# Patient Record
Sex: MALE | Race: WHITE | URBAN - METROPOLITAN AREA
[De-identification: names, ages, dates, MRNs, and addresses within clinical notes are randomized per-mention and may not be internally consistent; named-entity substitution may affect disease eponyms.]

---

## 2019-05-20 ENCOUNTER — PREPPED CHART (OUTPATIENT)
Dept: URBAN - METROPOLITAN AREA CLINIC 79 | Facility: CLINIC | Age: 84
End: 2019-05-20

## 2019-11-02 ASSESSMENT — TONOMETRY
OS_IOP_MMHG: 18
OD_IOP_MMHG: 18
OD_IOP_MMHG: 20
OS_IOP_MMHG: 20
OS_IOP_MMHG: 17
OS_IOP_MMHG: 12
OD_IOP_MMHG: 22
OD_IOP_MMHG: 14

## 2019-11-02 ASSESSMENT — VISUAL ACUITY
OD_SC: 20/40
OS_SC: 20/30

## 2019-12-05 ENCOUNTER — FOLLOW UP (OUTPATIENT)
Dept: URBAN - METROPOLITAN AREA CLINIC 79 | Facility: CLINIC | Age: 84
End: 2019-12-05

## 2019-12-05 DIAGNOSIS — I10: ICD-10-CM

## 2019-12-05 DIAGNOSIS — H16.223: ICD-10-CM

## 2019-12-05 DIAGNOSIS — H40.023: ICD-10-CM

## 2019-12-05 DIAGNOSIS — H40.033: ICD-10-CM

## 2019-12-05 DIAGNOSIS — H25.13: ICD-10-CM

## 2019-12-05 PROCEDURE — 92133 CPTRZD OPH DX IMG PST SGM ON: CPT

## 2019-12-05 PROCEDURE — 92014 COMPRE OPH EXAM EST PT 1/>: CPT

## 2019-12-05 ASSESSMENT — VISUAL ACUITY
OS_SC: 20/30-2
OD_SC: 20/50-2
OD_PH: 20/25-3

## 2019-12-05 ASSESSMENT — TONOMETRY
OD_IOP_MMHG: 19
OS_IOP_MMHG: 17

## 2020-02-18 ENCOUNTER — RX CHECK (OUTPATIENT)
Dept: URBAN - METROPOLITAN AREA CLINIC 79 | Facility: CLINIC | Age: 85
End: 2020-02-18

## 2020-02-18 DIAGNOSIS — H52.4: ICD-10-CM

## 2020-02-18 PROCEDURE — 92015 DETERMINE REFRACTIVE STATE: CPT | Mod: GY

## 2020-02-18 ASSESSMENT — VISUAL ACUITY
OS_SC: 20/30+2
OD_SC: 20/50-2
OD_CC: J6
OS_CC: J2
OD_PH: 20/30-1

## 2022-12-02 ENCOUNTER — ESTABLISHED COMPREHENSIVE EXAM (OUTPATIENT)
Dept: URBAN - METROPOLITAN AREA CLINIC 79 | Facility: CLINIC | Age: 87
End: 2022-12-02

## 2022-12-02 DIAGNOSIS — H25.13: ICD-10-CM

## 2022-12-02 DIAGNOSIS — H40.023: ICD-10-CM

## 2022-12-02 DIAGNOSIS — H01.00A: ICD-10-CM

## 2022-12-02 DIAGNOSIS — H35.3131: ICD-10-CM

## 2022-12-02 DIAGNOSIS — H01.00B: ICD-10-CM

## 2022-12-02 PROCEDURE — 92014 COMPRE OPH EXAM EST PT 1/>: CPT

## 2022-12-02 PROCEDURE — 92134 CPTRZ OPH DX IMG PST SGM RTA: CPT | Mod: NC

## 2022-12-02 PROCEDURE — 92133 CPTRZD OPH DX IMG PST SGM ON: CPT

## 2022-12-02 ASSESSMENT — VISUAL ACUITY
OS_PH: 20/30+2
OS_CC: J7-2
OD_PH: 20/100
OD_CC: J16-2
OD_SC: 20/200
OS_SC: 20/60+1

## 2022-12-02 ASSESSMENT — TONOMETRY
OD_IOP_MMHG: 18
OS_IOP_MMHG: 16

## 2022-12-20 ENCOUNTER — FOLLOW UP (OUTPATIENT)
Dept: URBAN - METROPOLITAN AREA CLINIC 79 | Facility: CLINIC | Age: 87
End: 2022-12-20

## 2022-12-20 DIAGNOSIS — H01.02A: ICD-10-CM

## 2022-12-20 DIAGNOSIS — H01.02B: ICD-10-CM

## 2022-12-20 DIAGNOSIS — H25.13: ICD-10-CM

## 2022-12-20 PROCEDURE — 92025 CPTRIZED CORNEAL TOPOGRAPHY: CPT | Mod: NC

## 2022-12-20 PROCEDURE — 92136 OPHTHALMIC BIOMETRY: CPT

## 2022-12-20 PROCEDURE — 92012 INTRM OPH EXAM EST PATIENT: CPT

## 2022-12-20 ASSESSMENT — VISUAL ACUITY
OD_CC: 20/100
OS_PH: 20/30
OD_PH: 20/80-1
OS_CC: 20/40-2

## 2022-12-20 ASSESSMENT — TONOMETRY
OD_IOP_MMHG: 18
OS_IOP_MMHG: 18

## 2023-01-18 ENCOUNTER — SURGERY/PROCEDURE (OUTPATIENT)
Dept: URBAN - METROPOLITAN AREA SURGICAL CENTER 17 | Facility: SURGICAL CENTER | Age: 88
End: 2023-01-18

## 2023-01-18 DIAGNOSIS — H25.11: ICD-10-CM

## 2023-01-18 PROCEDURE — 66982 XCAPSL CTRC RMVL CPLX WO ECP: CPT

## 2023-01-19 ENCOUNTER — 1 DAY POST-OP (OUTPATIENT)
Dept: URBAN - METROPOLITAN AREA CLINIC 79 | Facility: CLINIC | Age: 88
End: 2023-01-19

## 2023-01-19 DIAGNOSIS — H40.051: ICD-10-CM

## 2023-01-19 DIAGNOSIS — Z96.1: ICD-10-CM

## 2023-01-19 PROCEDURE — 99024 POSTOP FOLLOW-UP VISIT: CPT

## 2023-01-19 ASSESSMENT — VISUAL ACUITY
OD_SC: 20/30+3
OS_CC: 20/60

## 2023-01-19 ASSESSMENT — TONOMETRY: OD_IOP_MMHG: 31

## 2023-01-24 ENCOUNTER — FOLLOW UP (OUTPATIENT)
Dept: URBAN - METROPOLITAN AREA CLINIC 79 | Facility: CLINIC | Age: 88
End: 2023-01-24

## 2023-01-24 DIAGNOSIS — H25.11: ICD-10-CM

## 2023-01-24 PROCEDURE — 92012 INTRM OPH EXAM EST PATIENT: CPT | Mod: 24

## 2023-01-24 PROCEDURE — 92136 OPHTHALMIC BIOMETRY: CPT | Mod: 26

## 2023-01-24 ASSESSMENT — TONOMETRY
OD_IOP_MMHG: 14
OS_IOP_MMHG: 14

## 2023-01-24 ASSESSMENT — VISUAL ACUITY
OD_SC: 20/40
OS_SC: 20/70

## 2023-02-10 ENCOUNTER — FOLLOW UP (OUTPATIENT)
Dept: URBAN - METROPOLITAN AREA CLINIC 79 | Facility: CLINIC | Age: 88
End: 2023-02-10

## 2023-02-10 DIAGNOSIS — H01.02B: ICD-10-CM

## 2023-02-10 DIAGNOSIS — H25.11: ICD-10-CM

## 2023-02-10 PROCEDURE — 92012 INTRM OPH EXAM EST PATIENT: CPT | Mod: 24

## 2023-02-10 ASSESSMENT — VISUAL ACUITY
OS_PH: 20/40-1
OS_SC: 20/70-1
OD_SC: 20/25

## 2023-02-10 ASSESSMENT — TONOMETRY
OD_IOP_MMHG: 10
OS_IOP_MMHG: 12

## 2023-02-15 ENCOUNTER — SURGERY/PROCEDURE (OUTPATIENT)
Dept: URBAN - METROPOLITAN AREA SURGICAL CENTER 17 | Facility: SURGICAL CENTER | Age: 88
End: 2023-02-15

## 2023-02-15 DIAGNOSIS — H25.12: ICD-10-CM

## 2023-02-15 PROCEDURE — 66982 XCAPSL CTRC RMVL CPLX WO ECP: CPT | Mod: 79,LT

## 2023-02-16 ENCOUNTER — 1 DAY POST-OP (OUTPATIENT)
Dept: URBAN - METROPOLITAN AREA CLINIC 79 | Facility: CLINIC | Age: 88
End: 2023-02-16

## 2023-02-16 DIAGNOSIS — Z96.1: ICD-10-CM

## 2023-02-16 PROCEDURE — 99024 POSTOP FOLLOW-UP VISIT: CPT

## 2023-02-16 ASSESSMENT — VISUAL ACUITY
OD_SC: 20/30-2
OD_PH: 20/20
OS_SC: 20/20-1

## 2023-02-16 ASSESSMENT — TONOMETRY: OS_IOP_MMHG: 21

## 2023-02-21 ENCOUNTER — 1 WEEK POST-OP (OUTPATIENT)
Dept: URBAN - METROPOLITAN AREA CLINIC 79 | Facility: CLINIC | Age: 88
End: 2023-02-21

## 2023-02-21 DIAGNOSIS — Z96.1: ICD-10-CM

## 2023-02-21 PROCEDURE — 99024 POSTOP FOLLOW-UP VISIT: CPT

## 2023-02-21 ASSESSMENT — TONOMETRY: OS_IOP_MMHG: 15

## 2023-02-21 ASSESSMENT — VISUAL ACUITY
OS_SC: 20/20
OD_SC: 20/30+2

## 2023-03-14 ENCOUNTER — POST-OP CHECK (OUTPATIENT)
Dept: URBAN - METROPOLITAN AREA CLINIC 79 | Facility: CLINIC | Age: 88
End: 2023-03-14

## 2023-03-14 DIAGNOSIS — Z96.1: ICD-10-CM

## 2023-03-14 DIAGNOSIS — H35.3131: ICD-10-CM

## 2023-03-14 PROCEDURE — 92134 CPTRZ OPH DX IMG PST SGM RTA: CPT

## 2023-03-14 PROCEDURE — 99024 POSTOP FOLLOW-UP VISIT: CPT

## 2023-03-14 ASSESSMENT — VISUAL ACUITY
OD_SC: 20/50
OD_SC: 20/30-1
OS_SC: 20/20
OS_SC: 20/50

## 2023-03-14 ASSESSMENT — TONOMETRY
OD_IOP_MMHG: 9
OS_IOP_MMHG: 10

## 2023-07-28 ENCOUNTER — PROBLEM (OUTPATIENT)
Dept: URBAN - METROPOLITAN AREA CLINIC 79 | Facility: CLINIC | Age: 88
End: 2023-07-28

## 2023-07-28 DIAGNOSIS — H43.812: ICD-10-CM

## 2023-07-28 DIAGNOSIS — H35.3131: ICD-10-CM

## 2023-07-28 PROCEDURE — 92134 CPTRZ OPH DX IMG PST SGM RTA: CPT

## 2023-07-28 PROCEDURE — 92014 COMPRE OPH EXAM EST PT 1/>: CPT

## 2023-07-28 ASSESSMENT — VISUAL ACUITY
OS_SC: 20/25-2
OD_SC: 20/25+3

## 2023-07-28 ASSESSMENT — TONOMETRY
OD_IOP_MMHG: 11
OS_IOP_MMHG: 9

## 2023-09-01 ENCOUNTER — FOLLOW UP (OUTPATIENT)
Dept: URBAN - METROPOLITAN AREA CLINIC 79 | Facility: CLINIC | Age: 88
End: 2023-09-01

## 2023-09-01 DIAGNOSIS — Z96.1: ICD-10-CM

## 2023-09-01 DIAGNOSIS — H35.3131: ICD-10-CM

## 2023-09-01 DIAGNOSIS — H43.812: ICD-10-CM

## 2023-09-01 DIAGNOSIS — H40.023: ICD-10-CM

## 2023-09-01 PROCEDURE — 92014 COMPRE OPH EXAM EST PT 1/>: CPT

## 2023-09-01 ASSESSMENT — VISUAL ACUITY
OD_PH: 20/25
OD_SC: 20/40-1
OS_SC: 20/25-2

## 2023-09-01 ASSESSMENT — TONOMETRY
OS_IOP_MMHG: 09
OD_IOP_MMHG: 10

## 2024-02-06 ENCOUNTER — PROBLEM (OUTPATIENT)
Dept: URBAN - METROPOLITAN AREA CLINIC 79 | Facility: CLINIC | Age: 89
End: 2024-02-06

## 2024-02-06 DIAGNOSIS — H00.14: ICD-10-CM

## 2024-02-06 PROCEDURE — 92012 INTRM OPH EXAM EST PATIENT: CPT

## 2024-02-06 ASSESSMENT — VISUAL ACUITY
OS_SC: 20/20
OD_SC: 20/25

## 2024-02-06 ASSESSMENT — TONOMETRY
OS_IOP_MMHG: 12
OD_IOP_MMHG: 12

## 2024-02-20 ENCOUNTER — FOLLOW UP (OUTPATIENT)
Dept: URBAN - METROPOLITAN AREA CLINIC 79 | Facility: CLINIC | Age: 89
End: 2024-02-20

## 2024-02-20 DIAGNOSIS — H00.14: ICD-10-CM

## 2024-02-20 PROCEDURE — 92012 INTRM OPH EXAM EST PATIENT: CPT

## 2024-02-20 ASSESSMENT — VISUAL ACUITY
OD_SC: 20/25-2
OS_SC: 20/25

## 2024-02-20 ASSESSMENT — TONOMETRY
OD_IOP_MMHG: 14
OS_IOP_MMHG: 12

## 2024-09-04 ENCOUNTER — HOSPITAL ENCOUNTER (EMERGENCY)
Dept: HOSPITAL 99 - EMR | Age: 89
Discharge: LEFT BEFORE DISCHARGE | End: 2024-09-04
Payer: MEDICARE

## 2024-09-04 VITALS — DIASTOLIC BLOOD PRESSURE: 96 MMHG | SYSTOLIC BLOOD PRESSURE: 162 MMHG | RESPIRATION RATE: 18 BRPM

## 2024-09-04 DIAGNOSIS — Z53.21: ICD-10-CM

## 2024-09-04 DIAGNOSIS — R22.42: Primary | ICD-10-CM

## 2024-09-04 DIAGNOSIS — M79.605: ICD-10-CM

## 2024-09-04 LAB
ALBUMIN SERPL-MCNC: 4.4 G/DL (ref 3.5–5)
ALP SERPL-CCNC: 76 U/L (ref 38–126)
ALT SERPL-CCNC: 21 U/L (ref 0–50)
AST SERPL-CCNC: 34 U/L (ref 17–59)
BUN SERPL-MCNC: 30 MG/DL (ref 9–20)
CALCIUM SERPL-MCNC: 10 MG/DL (ref 8.4–10.2)
CHLORIDE SERPL-SCNC: 102 MMOL/L (ref 98–107)
CO2 SERPL-SCNC: 22 MMOL/L (ref 22–30)
EGFR: 44.5
ERYTHROCYTE [DISTWIDTH] IN BLOOD BY AUTOMATED COUNT: 11.9 % (ref 11.5–14.5)
GLUCOSE SERPL-MCNC: 161 MG/DL (ref 70–99)
HCT VFR BLD AUTO: 37.5 % (ref 39–52)
HGB BLD-MCNC: 13 G/DL (ref 13–18)
MCHC RBC AUTO-ENTMCNC: 34.7 G/DL (ref 33–37)
MCV RBC AUTO: 99.5 FL (ref 80–94)
NRBC BLD AUTO-RTO: 0 %
PLATELET # BLD AUTO: 180 10^3/UL (ref 130–400)
POTASSIUM SERPL-SCNC: 4.8 MMOL/L (ref 3.5–5.1)
PROT SERPL-MCNC: 6.9 G/DL (ref 6.3–8.2)
SODIUM SERPL-SCNC: 141 MMOL/L (ref 135–145)
URATE SERPL-MCNC: 8.4 MG/DL (ref 3.5–8.5)

## 2024-09-04 PROCEDURE — 99281 EMR DPT VST MAYX REQ PHY/QHP: CPT

## 2024-09-04 NOTE — ED.PDOC.TRB
"ED Provider Triage"~"-"~"Patient seen by provider in Triage?: Seen in Triage"~"-: "~"This is a 89 year old male that comes in with c/o pain in the left that is shooting up in to his leg. This started 2.5 weeks ago. Pain has increased. "

## 2024-09-04 NOTE — EDRN
This RN was informed that Patient was seen ambulating out of the department by ED registration within the last 10 minutes.

## 2024-09-18 ENCOUNTER — HOSPITAL ENCOUNTER (OUTPATIENT)
Dept: HOSPITAL 99 - REG | Age: 89
End: 2024-09-18
Payer: MEDICARE

## 2024-09-18 DIAGNOSIS — M10.9: Primary | ICD-10-CM

## 2024-09-18 DIAGNOSIS — N18.31: ICD-10-CM

## 2024-09-18 LAB
ALBUMIN SERPL-MCNC: 4.6 G/DL (ref 3.5–5)
ALP SERPL-CCNC: 65 U/L (ref 38–126)
ALT SERPL-CCNC: 28 U/L (ref 0–50)
AST SERPL-CCNC: 29 U/L (ref 17–59)
BUN SERPL-MCNC: 38 MG/DL (ref 9–20)
CALCIUM SERPL-MCNC: 10.1 MG/DL (ref 8.4–10.2)
CHLORIDE SERPL-SCNC: 97 MMOL/L (ref 98–107)
CO2 SERPL-SCNC: 26 MMOL/L (ref 22–30)
EGFR: 44.5
ERYTHROCYTE [DISTWIDTH] IN BLOOD BY AUTOMATED COUNT: 12.1 % (ref 11.5–14.5)
GLUCOSE SERPL-MCNC: 108 MG/DL (ref 70–99)
HCT VFR BLD AUTO: 38.4 % (ref 39–52)
HGB BLD-MCNC: 12.9 G/DL (ref 13–18)
MCHC RBC AUTO-ENTMCNC: 33.6 G/DL (ref 33–37)
MCV RBC AUTO: 98.2 FL (ref 80–94)
NRBC BLD AUTO-RTO: 0 %
PLATELET # BLD AUTO: 233 10^3/UL (ref 130–400)
POTASSIUM SERPL-SCNC: 5.6 MMOL/L (ref 3.5–5.1)
PROT SERPL-MCNC: 7 G/DL (ref 6.3–8.2)
SODIUM SERPL-SCNC: 137 MMOL/L (ref 135–145)
URATE SERPL-MCNC: 8.1 MG/DL (ref 3.5–8.5)

## 2024-12-05 ENCOUNTER — HOSPITAL ENCOUNTER (OUTPATIENT)
Dept: HOSPITAL 99 - REG | Age: 89
End: 2024-12-05
Payer: MEDICARE

## 2024-12-05 DIAGNOSIS — I10: Primary | ICD-10-CM

## 2024-12-05 LAB
BUN SERPL-MCNC: 30 MG/DL (ref 9–20)
CALCIUM SERPL-MCNC: 9.7 MG/DL (ref 8.4–10.2)
CHLORIDE SERPL-SCNC: 99 MMOL/L (ref 98–107)
CO2 SERPL-SCNC: 29 MMOL/L (ref 22–30)
EGFR: 48.34
GLUCOSE SERPL-MCNC: 89 MG/DL (ref 70–99)
POTASSIUM SERPL-SCNC: 4.3 MMOL/L (ref 3.5–5.1)
SODIUM SERPL-SCNC: 138 MMOL/L (ref 135–145)

## 2025-03-05 ENCOUNTER — HOSPITAL ENCOUNTER (OUTPATIENT)
Dept: HOSPITAL 99 - REG | Age: 89
End: 2025-03-05
Payer: COMMERCIAL

## 2025-03-05 DIAGNOSIS — N18.31: ICD-10-CM

## 2025-03-05 DIAGNOSIS — R79.89: ICD-10-CM

## 2025-03-05 DIAGNOSIS — E78.5: ICD-10-CM

## 2025-03-05 DIAGNOSIS — I10: ICD-10-CM

## 2025-03-05 DIAGNOSIS — M1A.3721: ICD-10-CM

## 2025-03-05 DIAGNOSIS — G47.30: ICD-10-CM

## 2025-03-05 DIAGNOSIS — G62.9: Primary | ICD-10-CM

## 2025-03-05 DIAGNOSIS — I25.10: ICD-10-CM

## 2025-03-05 LAB
ALBUMIN SERPL-MCNC: 4 G/DL (ref 3.5–5)
ALP SERPL-CCNC: 67 U/L (ref 38–126)
ALT SERPL-CCNC: 21 U/L (ref 0–50)
AST SERPL-CCNC: 30 U/L (ref 17–59)
BUN SERPL-MCNC: 37 MG/DL (ref 9–20)
CALCIUM SERPL-MCNC: 9.9 MG/DL (ref 8.4–10.2)
CHLORIDE SERPL-SCNC: 102 MMOL/L (ref 98–107)
CO2 SERPL-SCNC: 30 MMOL/L (ref 22–30)
EGFR: 44.5
ERYTHROCYTE [DISTWIDTH] IN BLOOD BY AUTOMATED COUNT: 12.5 % (ref 11.5–14.5)
FERRITIN SERPL-MCNC: 145 NG/ML (ref 17.9–464)
GLUCOSE SERPL-MCNC: 98 MG/DL (ref 70–99)
HCT VFR BLD AUTO: 38.1 % (ref 39–52)
HDLC SERPL-MCNC: 53 MG/DL
HGB BLD-MCNC: 12.7 G/DL (ref 13–18)
IRON SERPL-MCNC: 134 UG/DL (ref 49–181)
LDLC SERPL CALC-MCNC: 60 MG/DL
MCHC RBC AUTO-ENTMCNC: 33.3 G/DL (ref 33–37)
MCV RBC AUTO: 100 FL (ref 80–94)
NRBC BLD AUTO-RTO: 0 %
PLATELET # BLD AUTO: 190 10^3/UL (ref 130–400)
POTASSIUM SERPL-SCNC: 5 MMOL/L (ref 3.5–5.1)
PROT SERPL-MCNC: 6.5 G/DL (ref 6.3–8.2)
SODIUM SERPL-SCNC: 138 MMOL/L (ref 135–145)
URATE SERPL-MCNC: 9.6 MG/DL (ref 3.5–8.5)
VIT B12 SERPL-MCNC: 836 PG/ML (ref 239–931)
VLDLC SERPL CALC-MCNC: 20 MG/DL (ref 0–30)

## (undated) RX ORDER — BRIMONIDINE TARTRATE 1.5 MG/ML: 1 SOLUTION/ DROPS OPHTHALMIC TWICE A DAY

## (undated) RX ORDER — OLOPATADINE HYDROCHLORIDE 2 MG/ML: 1 SOLUTION OPHTHALMIC

## (undated) RX ORDER — PREDNISOLONE ACETATE 10 MG/ML: 1 SUSPENSION/ DROPS OPHTHALMIC

## (undated) RX ORDER — BROMFENAC 0.76 MG/ML: 1 SOLUTION/ DROPS OPHTHALMIC ONCE A DAY

## (undated) RX ORDER — MOXIFLOXACIN OPHTHALMIC 5 MG/ML: 1 SOLUTION/ DROPS OPHTHALMIC

## (undated) RX ORDER — NEOMYCIN SULFATE, POLYMYXIN B SULFATE AND DEXAMETHASONE 3.5; 10000; 1 MG/G; [USP'U]/G; MG/G: 1/4 OINTMENT OPHTHALMIC TWICE A DAY